# Patient Record
Sex: MALE | Race: WHITE
[De-identification: names, ages, dates, MRNs, and addresses within clinical notes are randomized per-mention and may not be internally consistent; named-entity substitution may affect disease eponyms.]

---

## 2017-03-08 ENCOUNTER — HOSPITAL ENCOUNTER (OUTPATIENT)
Dept: HOSPITAL 39 - GMAJ | Age: 62
Discharge: HOME | End: 2017-03-08
Attending: FAMILY MEDICINE
Payer: COMMERCIAL

## 2017-03-08 DIAGNOSIS — Z12.5: Primary | ICD-10-CM

## 2017-09-07 ENCOUNTER — HOSPITAL ENCOUNTER (OUTPATIENT)
Dept: HOSPITAL 39 - GMAJ | Age: 62
Discharge: HOME | End: 2017-09-07
Attending: FAMILY MEDICINE
Payer: COMMERCIAL

## 2017-09-07 DIAGNOSIS — Z00.00: Primary | ICD-10-CM

## 2018-03-06 ENCOUNTER — HOSPITAL ENCOUNTER (OUTPATIENT)
Dept: HOSPITAL 39 - GMAJ | Age: 63
End: 2018-03-06
Attending: FAMILY MEDICINE
Payer: COMMERCIAL

## 2018-03-06 DIAGNOSIS — I10: ICD-10-CM

## 2018-03-06 DIAGNOSIS — M17.0: Primary | ICD-10-CM

## 2018-08-30 ENCOUNTER — HOSPITAL ENCOUNTER (OUTPATIENT)
Dept: HOSPITAL 39 - GMAJ | Age: 63
End: 2018-08-30
Attending: FAMILY MEDICINE
Payer: COMMERCIAL

## 2018-08-30 DIAGNOSIS — K80.20: ICD-10-CM

## 2018-08-30 DIAGNOSIS — Z00.00: Primary | ICD-10-CM

## 2018-08-30 DIAGNOSIS — K76.0: ICD-10-CM

## 2018-08-30 DIAGNOSIS — R94.5: ICD-10-CM

## 2018-08-30 NOTE — US
EXAM DESCRIPTION: Abdomen,Complete



CLINICAL HISTORY: ABNORMAL RESULTS OF LIVER FUNCTION STUDIES



COMPARISON: None Available.



TECHNIQUE: Complete abdominal ultrasound



FINDINGS: 



Visualized portions of the pancreas are unremarkable. No

peripancreatic fluid. Bowel gas obscures some areas.



Normal caliber of the aorta.



Normal appearance of the inferior vena cava.



Liver parenchyma is hyperechoic consistent with diffuse hepatic

steatosis. The liver is enlarged measuring 21 cm in craniocaudal

length. No liver mass or intrahepatic bile duct dilatation. No

liver surface irregularity.

Normal appearance of hepatic veins and portal vein.



Gallbladder appears normal in size with multiple intraluminal

shadowing stones. No wall thickening. Sonographic Pate sign is

reported as negative.



Common bile duct is normal in caliber measuring 3.9 mm.



The right kidney measures 11.4 cm in length. Normal renal

cortical echogenicity. The renal cortical thickness appears

decreased at the lower pole. No right renal mass or shadowing

stone. Small cyst at the lower pole of the right kidney measures

1.7 cm. There is no hydronephrosis.



Spleen is normal in size. No focal splenic lesion.



The left kidney measures 11.2 cm in length. Normal renal cortical

echogenicity. The renal cortical thickness appears normal. No

left renal mass, shadowing stone or cyst. There is no

hydronephrosis.



IMPRESSION:



Enlarged liver with diffuse hepatic steatosis.



Gallstones without other changes to suggest acute cholecystitis.



Electronically signed by:  Sal Burrows MD  8/30/2018 3:14

PM CDT Workstation: [id number]

## 2018-09-20 NOTE — RAD
EXAM DESCRIPTION: 



Chest,2 Views



CLINICAL HISTORY: 



z01.811  preop



COMPARISON: 



None available



FINDINGS: 



Frontal and lateral views of the chest.

Cardiac silhouette and pulmonary vascularity are within normal

limits.

Minimal opacities in the bilateral lung bases most likely

representing subsegmental atelectasis. Otherwise, there is no

focal consolidative pulmonary infiltrates.

Costophrenic angles are sharp.

No pneumothorax.

Degenerative changes of the thoracic spine.



IMPRESSION: 



Minimal subsegmental atelectasis in the bilateral lung bases.

Otherwise, lungs are clear without focal consolidative

infiltrates.



Electronically signed by:  Aristides Gaspar MD  9/20/2018 8:59 AM CDT

Workstation: [id number]

## 2018-09-24 ENCOUNTER — HOSPITAL ENCOUNTER (OUTPATIENT)
Dept: HOSPITAL 39 - AMB | Age: 63
Discharge: HOME | End: 2018-09-24
Attending: SURGERY
Payer: COMMERCIAL

## 2018-09-24 VITALS — DIASTOLIC BLOOD PRESSURE: 68 MMHG | TEMPERATURE: 96 F | OXYGEN SATURATION: 97 % | SYSTOLIC BLOOD PRESSURE: 98 MMHG

## 2018-09-24 DIAGNOSIS — K76.0: ICD-10-CM

## 2018-09-24 DIAGNOSIS — I10: ICD-10-CM

## 2018-09-24 DIAGNOSIS — Z87.891: ICD-10-CM

## 2018-09-24 DIAGNOSIS — Z79.84: ICD-10-CM

## 2018-09-24 DIAGNOSIS — E66.9: ICD-10-CM

## 2018-09-24 DIAGNOSIS — K80.10: Primary | ICD-10-CM

## 2018-09-24 DIAGNOSIS — E11.9: ICD-10-CM

## 2018-09-24 DIAGNOSIS — E78.2: ICD-10-CM

## 2018-09-24 DIAGNOSIS — Z79.899: ICD-10-CM

## 2018-09-24 PROCEDURE — 82948 REAGENT STRIP/BLOOD GLUCOSE: CPT

## 2018-09-24 PROCEDURE — 36415 COLL VENOUS BLD VENIPUNCTURE: CPT

## 2018-09-24 PROCEDURE — 81001 URINALYSIS AUTO W/SCOPE: CPT

## 2018-09-24 PROCEDURE — 80053 COMPREHEN METABOLIC PANEL: CPT

## 2018-09-24 PROCEDURE — 47563 LAPARO CHOLECYSTECTOMY/GRAPH: CPT

## 2018-09-24 PROCEDURE — 76000 FLUOROSCOPY <1 HR PHYS/QHP: CPT

## 2018-09-24 PROCEDURE — 47001 NDL BIOPSY LVR TM OTH MAJ PX: CPT

## 2018-09-24 PROCEDURE — 93005 ELECTROCARDIOGRAM TRACING: CPT

## 2018-09-24 PROCEDURE — 71046 X-RAY EXAM CHEST 2 VIEWS: CPT

## 2018-09-24 PROCEDURE — 00790 ANES IPER UPR ABD NOS: CPT

## 2018-09-24 PROCEDURE — 85025 COMPLETE CBC W/AUTO DIFF WBC: CPT

## 2018-09-24 PROCEDURE — 36416 COLLJ CAPILLARY BLOOD SPEC: CPT

## 2018-09-24 NOTE — OP
DATE OF PROCEDURE:  09/24/18



PREOPERATIVE DIAGNOSIS:

1.  Symptomatic cholelithiasis.

2.  Elevated liver function tests.

3.  Fatty infiltration of the liver.

4.  Diabetes mellitus, type 2.



POSTOPERATIVE DIAGNOSIS:

1.  Symptomatic cholelithiasis.

2.  Elevated liver function tests.

3.  Fatty infiltration of the liver.

4.  Diabetes mellitus, type 2.

5.  Chronic cholecystitis.



PROCEDURE:

1.  Laparoscopic cholecystectomy with intraoperative cholangiography using 
fluoroscopy.

2.  Wedge biopsy, right lobe of the liver.



SURGEON:  Donald A. Behr, MD.



ASSISTANT:  None.



ANESTHESIA:  Local infiltration of 0.25% Marcaine with epinephrine and general 
endotracheal anesthesia.



INDICATION:  The patient is a 62-year-old male who was seen with some 
epigastric discomfort.  Liver function tests were noted to be abnormal, so he 
underwent an ultrasound examination which revealed cholelithiasis with normal 
ducts, no pericholecystic fluid, but changes in the liver consistent with fatty 
infiltration.  The patient was brought to the Surgical Suite today for 
cholecystectomy with cholangiography and wedge biopsy of the liver after the 
risks, benefits and alternatives to the procedure were discussed and accepted.



FINDINGS:   The gallbladder was distended and quite large.  There were multiple 
stones.  Intraoperative cholangiography revealed free flow into the duodenum 
with no filling defects or strictures noted.  The liver did appear to have 
fatty infiltration of the liver, but no changes consistent with cirrhosis 
grossly.  Pathology is pending.  



DESCRIPTION OF PROCEDURE:  After adequate general endotracheal anesthesia was 
obtained, the patient was prepped and draped in the usual sterile manner.  
Surgical time-out was taken.  The supraumbilical area was infiltrated with 
local anesthesia.  A vertical incision was made.  Dissection was carried down 
through the skin and subcutaneous tissue to the midline fascia.  Traction 
sutures were placed on either side of the midline.  A small incision was made 
in the midline fascia and the peritoneum was opened bluntly.  Marty trocar was 
introduced under direct vision into the abdominal cavity and fixed in place 
with the 20 mL balloon.  CO2 was then insufflated until a pressure of 12 mmHg 
was reached and the abdomen was tympanitic in all four quadrants.  When this 
was done, the laparoscope was introduced.  The abdomen was inspected.  The 
patient was then placed in reverse Trendelenburg position, turned to the left 
side.  The upper abdominal ports were placed under direct vision.  The 
gallbladder was grasped, retracted superiorly and adhesions to the neck of the 
gallbladder were taken down using blunt dissection.  The neck of the 
gallbladder was retracted laterally.  The triangle of Calot was then explored 
with the cystic duct identified and isolated anteriorly.  The cystic duct was 
hemoclipped once proximally.  A small incision was made in the cystic duct. The 
cholangiogram catheter was introduced through a separate stab wound in the 
right upper quadrant, introduced into the cystic duct and clipped in place. 
Cholangiograms were then taken using fluoroscopy which revealed free flow into 
the duodenum with no filling defects or strictures noted.  When this was done, 
the cystic duct catheter was removed.  The cystic duct was hemoclipped three 
times distally and divided between the hemoclips.  The cystic artery was then 
clipped twice proximally and once distally and divided.  At this point, the 
gallbladder was then dissected free from the gallbladder bed of the liver using 
electrocautery.  When this was done, the gallbladder was placed in an EndoCatch 
bag and removed from the supraumbilical port site in the usual manner under 
direct vision.  At this point, the gallbladder bed of the liver was inspected.  
Adequate hemostasis was noted.  At this point, a wedge biopsy of the right lobe 
of the liver medial to the gallbladder fossa was done with sharp scissors.  The 
specimen was removed for pathological evaluation.  Hemostasis was then obtained 
using electrocautery turned up to 50.  When hemostasis was noted to be adequate
, the subhepatic space and subphrenic space were irrigated copiously with 
saline.  The danilo hepatis, gallbladder bed of the liver and the biopsy site 
were all inspected and adequate hemostasis was noted.  At this point, the CO2, 
the laparoscope and the supraumbilical port were removed under direct vision.  
Hemostasis was noted to be adequate.  The supraumbilical port site fascia was 
approximated with two figure-of-eight sutures of 0 PDS.   Subcutaneous tissue 
was irrigated with saline.  Skin edges were approximated with 4-0 Vicryl 
subcuticular sutures, benzoin and Steri-Strips. Sterile dressings were applied.
  The patient was awakened and taken to the Recovery Room in good and stable 
condition.  Estimated blood loss was less than 75 mL.  All sponge, needle and 
instrument counts were correct. 



#[id number]
[facility]

## 2019-02-26 ENCOUNTER — HOSPITAL ENCOUNTER (OUTPATIENT)
Dept: HOSPITAL 39 - GMAJ | Age: 64
End: 2019-02-26
Attending: FAMILY MEDICINE
Payer: COMMERCIAL

## 2019-02-26 DIAGNOSIS — Z12.5: Primary | ICD-10-CM

## 2020-03-13 ENCOUNTER — HOSPITAL ENCOUNTER (OUTPATIENT)
Age: 65
End: 2020-03-13
Payer: COMMERCIAL

## 2020-03-13 DIAGNOSIS — M16.0: Primary | ICD-10-CM

## 2020-03-13 DIAGNOSIS — M25.862: ICD-10-CM

## 2020-06-29 ENCOUNTER — HOSPITAL ENCOUNTER (OUTPATIENT)
Dept: HOSPITAL 39 - LAB.O | Age: 65
End: 2020-06-29
Attending: ORTHOPAEDIC SURGERY
Payer: COMMERCIAL

## 2020-06-29 DIAGNOSIS — Z01.818: Primary | ICD-10-CM

## 2020-07-06 ENCOUNTER — HOSPITAL ENCOUNTER (OUTPATIENT)
Dept: HOSPITAL 39 - GMAJ | Age: 65
End: 2020-07-06
Attending: FAMILY MEDICINE
Payer: COMMERCIAL

## 2020-07-06 DIAGNOSIS — R94.5: Primary | ICD-10-CM

## 2020-07-14 ENCOUNTER — HOSPITAL ENCOUNTER (OUTPATIENT)
Dept: HOSPITAL 39 - US | Age: 65
End: 2020-07-14
Attending: FAMILY MEDICINE
Payer: COMMERCIAL

## 2020-07-14 DIAGNOSIS — Z90.49: ICD-10-CM

## 2020-07-14 DIAGNOSIS — K76.0: ICD-10-CM

## 2020-07-14 DIAGNOSIS — N28.9: ICD-10-CM

## 2020-07-14 DIAGNOSIS — R16.0: Primary | ICD-10-CM

## 2020-07-14 NOTE — US
EXAM DESCRIPTION: 

Abdomen,Complete: Ultrasound.



CLINICAL HISTORY: 

64 years MaleABNORMAL RESULTS OF LIVER FUNCTION STUDIES



COMPARISON: 

None Available.



TECHNIQUE: 

Transabdominal scanning: grayscale and Doppler modes. Technically

difficult study due to patient body habitus.



FINDINGS: 

Gallbladder: Surgically removed. No fluid in the gallbladder

fossa. Non-tender with transducer pressure.



Common bile duct: caliber 5.4 mm within normal limits. 



Liver:  Increased echogenicity and dense; difficulty in

visualizing the posterior liver and soft tissues posterior to the

liver. Contour liver capsule smooth where seen. No fluid around

the liver. Intrahepatic biliary ducts normal caliber.  Doppler

hepatopedal flow and normal caliber portal vein. 8mm caliber at

the danilo hepatis..  Long axis right lobe 19.0 cm.



Pancreas: normal size and echogenicity.  Duct not seen. 



abdominal aorta: Normal caliber from the proximal segment to the

distal bifurcation.. 



IVC: visualized and normal caliber.



Right kidney: long axis measures 11.2 cm. Normal cortical

Echogenicity. Normal cortical thickness.  Decreased color Doppler

vascularity; no echogenic calcifications or hydronephrosis.



Left kidney: long axis measures 9.7 cm. Normal cortical

Echogenicity. Normal cortical thickness.  Normal vascularity; no

echogenic stones or hydronephrosis.



Spleen:  Normal. No focal lesions..  Long axis is 10.1 cm..



Other: None.



IMPRESSION: 

1. Mild to moderate hepatomegaly and steatosis of the liver.

Limited visualization of the posterior liver and soft tissues

posterior to the liver. Physiologic vascularity. The remainder of

the study is unremarkable. No ascites. Pancreas is negative.

2. Previous cholecystectomy with physiologic caliber of the

common bile duct. No fluid or tenderness.

3. Bilateral kidneys unremarkable except for decreased color

Doppler vascularity on the right. Spleen is unremarkable. Normal

caliber of the abdominal aorta and IVC.



Electronically signed by:  Parish Baptiste MD  7/14/2020 11:18 AM

CDT Workstation: [id number]

## 2020-08-04 ENCOUNTER — HOSPITAL ENCOUNTER (OUTPATIENT)
Dept: HOSPITAL 39 - LAB.O | Age: 65
End: 2020-08-04
Attending: ORTHOPAEDIC SURGERY
Payer: COMMERCIAL

## 2020-08-04 DIAGNOSIS — Z01.818: Primary | ICD-10-CM

## 2020-09-09 ENCOUNTER — HOSPITAL ENCOUNTER (OUTPATIENT)
Dept: HOSPITAL 39 - LAB.O | Age: 65
End: 2020-09-09
Attending: INTERNAL MEDICINE
Payer: COMMERCIAL

## 2020-09-09 DIAGNOSIS — R94.5: Primary | ICD-10-CM

## 2020-09-09 DIAGNOSIS — K76.0: ICD-10-CM

## 2020-11-11 ENCOUNTER — HOSPITAL ENCOUNTER (OUTPATIENT)
Dept: HOSPITAL 39 - AMB | Age: 65
Discharge: HOME | End: 2020-11-11
Attending: INTERNAL MEDICINE
Payer: COMMERCIAL

## 2020-11-11 VITALS — SYSTOLIC BLOOD PRESSURE: 158 MMHG | OXYGEN SATURATION: 98 % | TEMPERATURE: 98.3 F | DIASTOLIC BLOOD PRESSURE: 96 MMHG

## 2020-11-11 DIAGNOSIS — Z79.84: ICD-10-CM

## 2020-11-11 DIAGNOSIS — K76.0: ICD-10-CM

## 2020-11-11 DIAGNOSIS — Z12.11: Primary | ICD-10-CM

## 2020-11-11 DIAGNOSIS — D12.4: ICD-10-CM

## 2020-11-11 DIAGNOSIS — E66.9: ICD-10-CM

## 2020-11-11 DIAGNOSIS — K57.30: ICD-10-CM

## 2020-11-11 DIAGNOSIS — I10: ICD-10-CM

## 2020-11-11 DIAGNOSIS — E11.9: ICD-10-CM

## 2020-11-11 DIAGNOSIS — J44.9: ICD-10-CM

## 2020-11-11 DIAGNOSIS — Z79.899: ICD-10-CM

## 2020-11-11 DIAGNOSIS — K64.1: ICD-10-CM

## 2020-11-11 DIAGNOSIS — D12.2: ICD-10-CM

## 2020-11-11 PROCEDURE — 36416 COLLJ CAPILLARY BLOOD SPEC: CPT

## 2020-11-11 PROCEDURE — 45380 COLONOSCOPY AND BIOPSY: CPT

## 2020-11-11 PROCEDURE — 00812 ANES LWR INTST SCR COLSC: CPT

## 2020-11-11 PROCEDURE — 82948 REAGENT STRIP/BLOOD GLUCOSE: CPT

## 2020-11-11 NOTE — OP
DATE OF PROCEDURE:  11/11/20



PREOPERATIVE DIAGNOSIS: 

1.  Average risk colorectal screening.



POSTOPERATIVE DIAGNOSIS: 

1.  Colon polyps.

2.  Colonic diverticulosis.

3.  Internal hemorrhoids.  



PROCEDURE: 

1.  Colonoscopy with polypectomy.



SURGEON:  Anibal Ralph MD



ANESTHESIA:  Monitored anesthesia care.



ESTIMATED BLOOD LOSS:  Less than 5 mL.



COMPLICATIONS:  None.



PROCEDURE:  The patient was placed in the left lateral decubitus position.  A 
time-out was performed.  After deep sedation was achieved, a digital rectal exam
was performed and noted to be unremarkable.  The Olympus adult colonoscope was 
inserted through the anus, into the rectum and advanced to the cecum under 
direct visualization without difficulty.  The cecum was identified by the 
ileocecal valve and the appendiceal orifice.  Photodocumentation of these 
locations was performed.  The patients bowel preparation was fair.  The 
endoscope was then progressively withdrawn and the total colonic lumen 
evaluated.  Retroflexion was performed in the rectum.  The endoscope was then 
withdrawn and the procedure terminated.  The patient tolerated the procedure 
well with no immediate complications.  



FINDINGS:

1.  Two sessile polyps were found in the ascending colon, measuring 3 to 6 mm in

     size.  These polyps were successfully removed with a cold snare and 
retrieved

     for pathology.

2.  One sessile polyp was found in the descending colon, measuring 5 mm.  This

     polyp was removed with a cold snare and retrieved for pathology.

3.  Moderate to severe colonic diverticulosis was found in the sigmoid and 
descending

     colon, there was no evidence of diverticulitis or bleeding.

4.  Grade 2 non-bleeding internal hemorrhoids were seen upon retroflexion in the

     rectum.  



IMPRESSION:  

1.  Colonic polyps, removed as above.

2.  Colonic diverticulosis.

3.  Internal hemorrhoids.



RECOMMENDATIONS:  

1.  Okay to discharge home once the patient meets discharge criteria.

2.  Resume prior diet.

3.  Resume home medications.

4.  Await pathology results.

5.  Repeat colonoscopy for surveillance in 3 years given polyps seen today and 
fair

     bowel preparation.

6.  Followup in the GI clinic with Dr. Ralph in [place] in 1 to 2 months.



#[id number]

MTDD